# Patient Record
Sex: MALE | Race: BLACK OR AFRICAN AMERICAN | NOT HISPANIC OR LATINO | URBAN - METROPOLITAN AREA
[De-identification: names, ages, dates, MRNs, and addresses within clinical notes are randomized per-mention and may not be internally consistent; named-entity substitution may affect disease eponyms.]

---
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
## 2023-03-06 NOTE — HISTORY OF PRESENT ILLNESS
[de-identified] : vaccine [FreeTextEntry6] : 17 y/o male presents to clinic with request for vaccination. Reports being in a good state of health. Denies complaints at this time.  2